# Patient Record
Sex: MALE | Race: WHITE | NOT HISPANIC OR LATINO | Employment: STUDENT | URBAN - METROPOLITAN AREA
[De-identification: names, ages, dates, MRNs, and addresses within clinical notes are randomized per-mention and may not be internally consistent; named-entity substitution may affect disease eponyms.]

---
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
## 2023-01-03 ENCOUNTER — TELEPHONE (OUTPATIENT)
Dept: FAMILY MEDICINE CLINIC | Facility: CLINIC | Age: 18
End: 2023-01-03

## 2023-01-03 ENCOUNTER — OFFICE VISIT (OUTPATIENT)
Dept: FAMILY MEDICINE CLINIC | Facility: CLINIC | Age: 18
End: 2023-01-03

## 2023-01-03 VITALS
DIASTOLIC BLOOD PRESSURE: 50 MMHG | HEIGHT: 73 IN | HEART RATE: 73 BPM | BODY MASS INDEX: 26.51 KG/M2 | OXYGEN SATURATION: 98 % | RESPIRATION RATE: 12 BRPM | SYSTOLIC BLOOD PRESSURE: 110 MMHG | WEIGHT: 200 LBS

## 2023-01-03 DIAGNOSIS — R05.3 CHRONIC COUGH: Primary | ICD-10-CM

## 2023-01-03 DIAGNOSIS — R52 PAIN: ICD-10-CM

## 2023-01-03 DIAGNOSIS — F90.2 ATTENTION DEFICIT HYPERACTIVITY DISORDER (ADHD), COMBINED TYPE: ICD-10-CM

## 2023-01-03 RX ORDER — CELECOXIB 200 MG/1
200 CAPSULE ORAL 2 TIMES DAILY
Qty: 30 CAPSULE | Refills: 0 | Status: SHIPPED | OUTPATIENT
Start: 2023-01-03

## 2023-01-03 RX ORDER — MOMETASONE FUROATE 220 UG/1
1 INHALANT RESPIRATORY (INHALATION) EVERY EVENING
Qty: 1 EACH | Refills: 1 | Status: SHIPPED | OUTPATIENT
Start: 2023-01-03

## 2023-01-03 NOTE — PROGRESS NOTES
Assessment/Plan:    Attention deficit hyperactivity disorder (ADHD), combined type  ASRS self-assessment positive for ADHD  We discussed with him that we may try to use Adderall to improve his symptoms  Side effects discussed, we will do urine drug screen  Controlled substance agreement signed  If his urine drug screen will be negative we will start him on Adderall  Pain  Reports pain in the left side of his chest that started 7 days ago, no systemic symptoms or trauma  Clinically looks like musculoskeletal pain  We will use Celebrex 200 mg twice daily for 7 days  If any worsening of his symptoms being on the above treatment he will let me know immediately  Chronic cough  He said that the cough that he has right now started about 1 year ago after he moved down to the dorm  Right now he is planning of moving out from there  We will do trial off ICS  Explained to him how to use the inhaler and to swish mouth after that  He will let me know if no improvement  Diagnoses and all orders for this visit:    Chronic cough  -     mometasone (Asmanex, 30 Metered Doses,) 220 mcg/actuation inhaler; Inhale 1 puff every evening Rinse mouth after use  Pain  -     celecoxib (CeleBREX) 200 mg capsule; Take 1 capsule (200 mg total) by mouth 2 (two) times a day Use 1 pill twice a day for 7 days after that you may use it daily as needed for pain      Attention deficit hyperactivity disorder (ADHD), combined type  -     [facility]cribed Meds and Special Instructions  -     Amphetamines, Methamphetamines  -     Butalbital  -     Phenobarbital  -     Secobarbital  -     Temazepam  -     Alprazolam  -     Clonazepam  -     Diazepam  -     Lorazepam  -     Oxazepam  -     Gabapentin  -     Pregabalin  -     Cocaine  -     Heroin  -     Buprenorphine  -     Levorphanol  -     Meperidine  -     Naltrexone  -     Fentanyl  -     Methadone  -     Oxycodone  -     Oxymorphone  -     Tapentadol  -     THC  - Tramadol  -     Codeine, Hydrocodone, Hydropmorphone, Morphine  -     Bath Salts  -     Ethyl Glucuronide/Ethyl Sulfate  -     Kratom  -     Spice  -     Methylphenidate  -     Phentermine  -     Validity Oxidant  -     Validity Creatinine  -     Validity pH  -     Validity Specific          Subjective:      Patient ID: Isela Stoll is a 21 y o  male  Patient came today with number of new problems including chronic cough, left-sided chest pain and inability to concentrate  The following portions of the patient's history were reviewed and updated as appropriate: allergies, current medications, past family history, past medical history, past social history, past surgical history, and problem list     Review of Systems   Constitutional: Negative for chills, fatigue and fever  Respiratory: Positive for cough  Negative for chest tightness and shortness of breath  Cardiovascular: Positive for chest pain  Negative for palpitations and leg swelling  Gastrointestinal: Negative for abdominal distention, abdominal pain, blood in stool, constipation, diarrhea and nausea  Genitourinary: Negative for difficulty urinating and dysuria  Musculoskeletal: Negative for arthralgias, back pain, gait problem, joint swelling, myalgias and neck pain  Skin: Negative for rash  Neurological: Negative for dizziness, weakness, numbness and headaches  Psychiatric/Behavioral: Negative for agitation, dysphoric mood and suicidal ideas  The patient is nervous/anxious            Objective:      /50 (BP Location: Left arm, Patient Position: Sitting, Cuff Size: Large)   Pulse 73   Resp 12   Ht 6' 1" (1 854 m)   Wt 90 7 kg (200 lb)   SpO2 98%   BMI 26 39 kg/m²     Allergies   Allergen Reactions   • Influenza Vaccines      Pt has SOB and exzema          Current Outpatient Medications:   •  benzonatate (TESSALON) 200 MG capsule, Take 1 capsule (200 mg total) by mouth 3 (three) times a day as needed for cough, Disp: 20 capsule, Rfl: 0  •  celecoxib (CeleBREX) 200 mg capsule, Take 1 capsule (200 mg total) by mouth 2 (two) times a day Use 1 pill twice a day for 7 days after that you may use it daily as needed for pain , Disp: 30 capsule, Rfl: 0  •  mometasone (Asmanex, 30 Metered Doses,) 220 mcg/actuation inhaler, Inhale 1 puff every evening Rinse mouth after use , Disp: 1 each, Rfl: 1  •  predniSONE 10 mg tablet, 5 x 4 days, 4 x 1, 3 x 1, 2 x 1, 1 x 1, Disp: 30 tablet, Rfl: 0     Patient Instructions   Please start Celebrex 1 pill twice a day for your pain for 7 days after that you can use it daily as needed  Use inhaler daily at night as we discussed  Let me know in 2 weeks on new symptoms  Physical Exam  Constitutional:       General: He is not in acute distress  Appearance: He is not toxic-appearing  Cardiovascular:      Rate and Rhythm: Normal rate  Heart sounds: No murmur heard  No gallop  Pulmonary:      Effort: No respiratory distress  Breath sounds: No wheezing or rales  Musculoskeletal:         General: Tenderness (Left side of the chest, looks like muscular ) present  Neurological:      Mental Status: He is alert     Psychiatric:         Mood and Affect: Mood normal          Behavior: Behavior normal

## 2023-01-03 NOTE — ASSESSMENT & PLAN NOTE
He said that the cough that he has right now started about 1 year ago after he moved down to the dorm  Right now he is planning of moving out from there  We will do trial off ICS  Explained to him how to use the inhaler and to swish mouth after that  He will let me know if no improvement

## 2023-01-03 NOTE — TELEPHONE ENCOUNTER
Spoke to pt's mother on the phone and asked them to come back in the office to sign the controlled substance form  They went straight to the pharmacy thinking the Adderall would be ready to be picked up with his other medications  They said they will come back to sign

## 2023-01-03 NOTE — PATIENT INSTRUCTIONS
Please start Celebrex 1 pill twice a day for your pain for 7 days after that you can use it daily as needed  Use inhaler daily at night as we discussed  Let me know in 2 weeks on new symptoms

## 2023-01-03 NOTE — ASSESSMENT & PLAN NOTE
Reports pain in the left side of his chest that started 7 days ago, no systemic symptoms or trauma  Clinically looks like musculoskeletal pain  We will use Celebrex 200 mg twice daily for 7 days  If any worsening of his symptoms being on the above treatment he will let me know immediately

## 2023-01-03 NOTE — ASSESSMENT & PLAN NOTE
ASRS self-assessment positive for ADHD  We discussed with him that we may try to use Adderall to improve his symptoms  Side effects discussed, we will do urine drug screen  Controlled substance agreement signed  If his urine drug screen will be negative we will start him on Adderall

## 2023-01-05 LAB
